# Patient Record
Sex: MALE | Race: WHITE | NOT HISPANIC OR LATINO
[De-identification: names, ages, dates, MRNs, and addresses within clinical notes are randomized per-mention and may not be internally consistent; named-entity substitution may affect disease eponyms.]

---

## 2017-02-27 ENCOUNTER — RX RENEWAL (OUTPATIENT)
Age: 52
End: 2017-02-27

## 2017-03-06 ENCOUNTER — RX RENEWAL (OUTPATIENT)
Age: 52
End: 2017-03-06

## 2017-03-07 ENCOUNTER — MEDICATION RENEWAL (OUTPATIENT)
Age: 52
End: 2017-03-07

## 2017-06-02 ENCOUNTER — RX RENEWAL (OUTPATIENT)
Age: 52
End: 2017-06-02

## 2017-09-02 ENCOUNTER — EMERGENCY (EMERGENCY)
Facility: HOSPITAL | Age: 52
LOS: 1 days | Discharge: HOME | End: 2017-09-02
Attending: EMERGENCY MEDICINE | Admitting: EMERGENCY MEDICINE
Payer: COMMERCIAL

## 2017-09-02 VITALS
DIASTOLIC BLOOD PRESSURE: 91 MMHG | OXYGEN SATURATION: 98 % | TEMPERATURE: 98 F | HEART RATE: 86 BPM | SYSTOLIC BLOOD PRESSURE: 141 MMHG | RESPIRATION RATE: 16 BRPM

## 2017-09-02 VITALS
TEMPERATURE: 98 F | DIASTOLIC BLOOD PRESSURE: 88 MMHG | RESPIRATION RATE: 16 BRPM | OXYGEN SATURATION: 100 % | SYSTOLIC BLOOD PRESSURE: 135 MMHG | HEART RATE: 80 BPM

## 2017-09-02 LAB
BASOPHILS # BLD AUTO: 0.02 K/UL — SIGNIFICANT CHANGE UP (ref 0–0.2)
BASOPHILS NFR BLD AUTO: 0.4 % — SIGNIFICANT CHANGE UP (ref 0–2)
BUN SERPL-MCNC: 11 MG/DL — SIGNIFICANT CHANGE UP (ref 7–23)
CALCIUM SERPL-MCNC: 9.1 MG/DL — SIGNIFICANT CHANGE UP (ref 8.4–10.5)
CHLORIDE SERPL-SCNC: 101 MMOL/L — SIGNIFICANT CHANGE UP (ref 98–107)
CO2 SERPL-SCNC: 27 MMOL/L — SIGNIFICANT CHANGE UP (ref 22–31)
CREAT SERPL-MCNC: 0.7 MG/DL — SIGNIFICANT CHANGE UP (ref 0.5–1.3)
EOSINOPHIL # BLD AUTO: 0.12 K/UL — SIGNIFICANT CHANGE UP (ref 0–0.5)
EOSINOPHIL NFR BLD AUTO: 2.1 % — SIGNIFICANT CHANGE UP (ref 0–6)
GLUCOSE SERPL-MCNC: 100 MG/DL — HIGH (ref 70–99)
HCT VFR BLD CALC: 44.2 % — SIGNIFICANT CHANGE UP (ref 39–50)
HGB BLD-MCNC: 15.4 G/DL — SIGNIFICANT CHANGE UP (ref 13–17)
IMM GRANULOCYTES # BLD AUTO: 0.01 # — SIGNIFICANT CHANGE UP
IMM GRANULOCYTES NFR BLD AUTO: 0.2 % — SIGNIFICANT CHANGE UP (ref 0–1.5)
LYMPHOCYTES # BLD AUTO: 1.24 K/UL — SIGNIFICANT CHANGE UP (ref 1–3.3)
LYMPHOCYTES # BLD AUTO: 22.1 % — SIGNIFICANT CHANGE UP (ref 13–44)
MAGNESIUM SERPL-MCNC: 2 MG/DL — SIGNIFICANT CHANGE UP (ref 1.6–2.6)
MCHC RBC-ENTMCNC: 30.9 PG — SIGNIFICANT CHANGE UP (ref 27–34)
MCHC RBC-ENTMCNC: 34.8 % — SIGNIFICANT CHANGE UP (ref 32–36)
MCV RBC AUTO: 88.8 FL — SIGNIFICANT CHANGE UP (ref 80–100)
MONOCYTES # BLD AUTO: 0.42 K/UL — SIGNIFICANT CHANGE UP (ref 0–0.9)
MONOCYTES NFR BLD AUTO: 7.5 % — SIGNIFICANT CHANGE UP (ref 2–14)
NEUTROPHILS # BLD AUTO: 3.8 K/UL — SIGNIFICANT CHANGE UP (ref 1.8–7.4)
NEUTROPHILS NFR BLD AUTO: 67.7 % — SIGNIFICANT CHANGE UP (ref 43–77)
NRBC # FLD: 0 — SIGNIFICANT CHANGE UP
PHOSPHATE SERPL-MCNC: 2.4 MG/DL — LOW (ref 2.5–4.5)
PLATELET # BLD AUTO: 328 K/UL — SIGNIFICANT CHANGE UP (ref 150–400)
PMV BLD: 9.7 FL — SIGNIFICANT CHANGE UP (ref 7–13)
POTASSIUM SERPL-MCNC: 3.5 MMOL/L — SIGNIFICANT CHANGE UP (ref 3.5–5.3)
POTASSIUM SERPL-SCNC: 3.5 MMOL/L — SIGNIFICANT CHANGE UP (ref 3.5–5.3)
RBC # BLD: 4.98 M/UL — SIGNIFICANT CHANGE UP (ref 4.2–5.8)
RBC # FLD: 12.8 % — SIGNIFICANT CHANGE UP (ref 10.3–14.5)
SODIUM SERPL-SCNC: 140 MMOL/L — SIGNIFICANT CHANGE UP (ref 135–145)
WBC # BLD: 5.61 K/UL — SIGNIFICANT CHANGE UP (ref 3.8–10.5)
WBC # FLD AUTO: 5.61 K/UL — SIGNIFICANT CHANGE UP (ref 3.8–10.5)

## 2017-09-02 PROCEDURE — 99284 EMERGENCY DEPT VISIT MOD MDM: CPT | Mod: 25

## 2017-09-02 PROCEDURE — 70450 CT HEAD/BRAIN W/O DYE: CPT | Mod: 26

## 2017-09-02 RX ORDER — SODIUM CHLORIDE 9 MG/ML
1000 INJECTION INTRAMUSCULAR; INTRAVENOUS; SUBCUTANEOUS ONCE
Qty: 0 | Refills: 0 | Status: COMPLETED | OUTPATIENT
Start: 2017-09-02 | End: 2017-09-02

## 2017-09-02 RX ORDER — IBUPROFEN 200 MG
600 TABLET ORAL ONCE
Qty: 0 | Refills: 0 | Status: COMPLETED | OUTPATIENT
Start: 2017-09-02 | End: 2017-09-02

## 2017-09-02 RX ORDER — METOCLOPRAMIDE HCL 10 MG
10 TABLET ORAL ONCE
Qty: 0 | Refills: 0 | Status: COMPLETED | OUTPATIENT
Start: 2017-09-02 | End: 2017-09-02

## 2017-09-02 RX ORDER — DIPHENHYDRAMINE HCL 50 MG
25 CAPSULE ORAL ONCE
Qty: 0 | Refills: 0 | Status: COMPLETED | OUTPATIENT
Start: 2017-09-02 | End: 2017-09-02

## 2017-09-02 RX ORDER — ACETAMINOPHEN 500 MG
975 TABLET ORAL ONCE
Qty: 0 | Refills: 0 | Status: COMPLETED | OUTPATIENT
Start: 2017-09-02 | End: 2017-09-02

## 2017-09-02 RX ADMIN — Medication 600 MILLIGRAM(S): at 08:13

## 2017-09-02 RX ADMIN — SODIUM CHLORIDE 1000 MILLILITER(S): 9 INJECTION INTRAMUSCULAR; INTRAVENOUS; SUBCUTANEOUS at 08:45

## 2017-09-02 RX ADMIN — Medication 25 MILLIGRAM(S): at 08:45

## 2017-09-02 RX ADMIN — Medication 10 MILLIGRAM(S): at 08:45

## 2017-09-02 RX ADMIN — Medication 600 MILLIGRAM(S): at 09:30

## 2017-09-02 NOTE — ED ADULT NURSE NOTE - OBJECTIVE STATEMENT
Pt a&ox3 c/o headache for the past week, pt has been taking motrin without relief. Pt breathing even and unlabored. Pt denies cp/discomfort. Abdomen soft, non-tender, non-distended. Skin cool dry and intact. MD at bedside.

## 2017-09-02 NOTE — ED PROVIDER NOTE - PHYSICAL EXAMINATION
*GEN:   comfortable, in no apparent distress, AOx3  *EYES:   PERRL, extra-occular movements intact  *HEENT:   airway patent, moist mucosal membranes, tooth 32 cavity  *CV:   regular rate and rhythm, normal S1/S2  *RESP:   clear to auscultation bilaterally, non-labored  *ABD:   soft, non tender, no guarding  *:   no cva tenderness  *MSK:   no musculoskeletal tenderness  *SKIN:   dry, intact  *NEURO:   AOx3, no focal weakness or loss of sensation, gait normal

## 2017-09-02 NOTE — ED PROVIDER NOTE - ATTENDING CONTRIBUTION TO CARE
52M with pmh of HTN, HLD (not currently taking meds) presents with gradual onset headache x ~1 week. ~3-4 days to reach max intensity. frontal, some radiation to occiput. nausea, no vomiting. no double vision, numbness, weakness, dizziness, diff ambulating, confusion, change in speech. has experienced headaches before as adult, this one is slightly different in quality. currently 6/10. took 400 mg advil without sig improvement. no exacerbating or alleviating factors. no cp, sob, f/c.    PE: NAD, NCAT, MMM, Trachea midline, Normal conjunctiva, lungs CTAB, S1/S2 RRR, Normal perfusion, Abdomen Soft, NTND, No rebound/guarding, No LE edema, No deformity of extremities, No rashes,  No focal motor or sensory deficits. CN II-XII intact. Visual fields intact. EOMI, PERRLA. Facial sensation equal bilat. Smile and eye closure equal bilat. Hearing intact bilat. Palate elevation equal, tongue protrusion midline. Lateral head rotation equal bilat. 5/5 strength UE and LE bilat. No pronator drift. Normal finger to nose. Negative Romberg. Normal gait.     52M with HTN, HLD, with gradual onset headache x 1 week. no neuro deficits reported or on exam. very low suspicion SAH due to course of illness, exam findings. low suspicion of mass, but due to chronicity will obtain ct head to eval. basic labs eval for electrolyte abnormality, anemia. ivf, reglan, benadryl, ibuprofen. re-assess. - Germain Mosley MD

## 2017-09-02 NOTE — ED ADULT NURSE NOTE - CHIEF COMPLAINT QUOTE
Pt c/o frontal and Left parietal HA x1 wk (had similar HAs before), taking ibuprofen w/o relief, also difficulty sleeping and today was nauseous so came to ED. Denies falls/trauma/blurry vision. PMHx HTN, HLD. On Losartan, Hydrochlorothiazide, Crestor, Amlodipine, states was off some of his meds 2/2 insurance issue.  Cards MD Diez.  Pt appearing in no distress, VSS.

## 2017-09-02 NOTE — ED PROVIDER NOTE - MEDICAL DECISION MAKING DETAILS
51 yo w/ ha x 5 days longer than usual and nausea, adult onset ha, no other red flags, benign exam, well appearing, ct head r/o intracranial process, if neg dc w/ neuro f/u

## 2017-09-02 NOTE — ED PROVIDER NOTE - PROGRESS NOTE DETAILS
LAUREANO note   53 y/o M with PMH HTN, HLD p/w headache x 1 week. accompanied by nausea. No fever, no chills, no trauma. Headache 6/10 frontal longer than previous headache 3 months prior. not currently on his HTN meds due to insurance.  nml neuro exam, mild frontal sinus tenderness Cbc, bmp, ct head, reglan IVf, ibu, reassess. neuro follow up Dr. Raleigh Devine PGY1: cth appears nl, no rads read yet, pt ha pain unchanged Dr. Raleigh Devine PGY1: pt feels ha improved s/p pain meds, will dc if cth read comes back nl LAUREANO: patient reports feeling better, explained CT read w/ mild prominence of lateral ventricles and need for Neuro follow up with MRI. pt. and wife aware will follow up . pt. instructed on return precautions.

## 2017-09-02 NOTE — ED ADULT TRIAGE NOTE - CHIEF COMPLAINT QUOTE
Pt arrives to ED c/o frontal and Left parietal HA x1 wk, taking ibuprofen w/o relief, also difficulty sleeping and today was nauseous so came to ED. Denies falls/head trauma/blurry vision. PMHx HTN, HLD. On Losartan, Hydrochlorothiazide, Crestor, Amlodipine, states was off some of his meds 2/2 insurance issue.  Cards MD Diez.  Pt appearing in no distress, VSS. Pt c/o frontal and Left parietal HA x1 wk (had similar HAs before), taking ibuprofen w/o relief, also difficulty sleeping and today was nauseous so came to ED. Denies falls/trauma/blurry vision. PMHx HTN, HLD. On Losartan, Hydrochlorothiazide, Crestor, Amlodipine, states was off some of his meds 2/2 insurance issue.  Cards MD Diez.  Pt appearing in no distress, VSS.

## 2017-09-02 NOTE — ED PROVIDER NOTE - OBJECTIVE STATEMENT
53 yo M w/ pmh htn, hld p/w constant frontal/L parietal/L neck HA and nausea w/ dry retch, w/out vomit x5 days. Pt reports similar HA prior but shorter previously, last episode several months ago, resolved in 1 day w/ ibuprofen. Pt reports difficulty sleeping 2/2 pain, denies visual changes, sob, weakness, loss of sensation, change of bowel habitus, difficulty eating/drinking. Pt has been taking ibuprofen 400 qd, last dose 7pm last night. Pt has not been taking losartan/amlodipine/crestor/hctz x few weeks. Pt reports R tooth cavity.    Pt does not have pcp  Cards: Dr. Diez

## 2017-12-11 NOTE — ED ADULT TRIAGE NOTE - PAIN: PRESENCE, MLM
----- Message from Janene Perea sent at 12/11/2017  9:26 AM CST -----  Contact: [phone] self  Patient would like refill of amLODIPine (NORVASC) 10 MG tablet sent to [facility]. Please advise.    
complains of pain/discomfort

## 2020-07-06 NOTE — ED PROVIDER NOTE - DATE/TIME 4
Bedside shift change report given to [facility] (oncoming nurse) by Melissa Sanchez (offgoing nurse). Report included the following information SBAR, Kardex, Intake/Output, MAR, Accordion and Recent Results. 02-Sep-2017 10:28